# Patient Record
Sex: MALE | Race: WHITE | Employment: FULL TIME | URBAN - METROPOLITAN AREA
[De-identification: names, ages, dates, MRNs, and addresses within clinical notes are randomized per-mention and may not be internally consistent; named-entity substitution may affect disease eponyms.]

---

## 2018-02-13 ENCOUNTER — THERAPY VISIT (OUTPATIENT)
Dept: PHYSICAL THERAPY | Facility: CLINIC | Age: 43
End: 2018-02-13
Payer: COMMERCIAL

## 2018-02-13 DIAGNOSIS — R20.2 NUMBNESS AND TINGLING IN LEFT UPPER EXTREMITY: Primary | ICD-10-CM

## 2018-02-13 DIAGNOSIS — R20.0 NUMBNESS AND TINGLING IN LEFT UPPER EXTREMITY: Primary | ICD-10-CM

## 2018-02-13 PROBLEM — M54.2 CERVICALGIA: Status: ACTIVE | Noted: 2018-02-13

## 2018-02-13 PROCEDURE — 97110 THERAPEUTIC EXERCISES: CPT | Mod: GP | Performed by: PHYSICAL THERAPIST

## 2018-02-13 PROCEDURE — 97161 PT EVAL LOW COMPLEX 20 MIN: CPT | Mod: GP | Performed by: PHYSICAL THERAPIST

## 2018-02-13 NOTE — PROGRESS NOTES
[place] [facility] Initial Evaluation  Subjective:  HPI                    Objective:  System    Physical Exam    [facility] for [facility] Initial Evaluation -- Cervical    Evaluation Date: February 13, 2018  Jose Manuel Xie is a 43 year old male with a L arm condition.   Referral: neurology  Work mechanical stresses:  [employer]  Employment status: FT  Leisure mechanical stresses:   Functional disability score (NDI):  See chart  VAS score (0-10): 0  Patient goals/expectations:  Prevent neck pain correct posture strengthen hand    HISTORY:    Present symptoms: neck stiffness. L  medial forearm N/T to  hand- index and thumb primarily.weakness hand.    He is R handed. Chin, bottom lip numbness.  Pain quality (sharp/shooting/stabbing/aching/burning/cramping):  No pain  Present since (onset date): 12.15.2017.     Symptoms (improving/unchanging/worsening):  improving    Symptoms commenced as a result of: sleeping on L side with arm flexed underneath him.  Condition occurred in the following environment:  In laws home    Symptoms at onset (neck/arm/forearm/headache): arm and hand  Constant symptoms (neck/arm/forearm/headache):  Yes for all  Intermittent symptoms (neck/arm/forearm/headache):     Symptoms are made worse with the following: time of day - No effect movement, postures- no effect.  Symptoms are made better with the following: nothing     Disturbed sleep (yes/no): no  Number of pillows:   Sleeping postures (prone/sup/side R/L): R SL now    Previous episodes (0/1-5/6-10/11+): 0 Year of first episode:     Previous history:   Previous treatments:     Specific Questions: (as reported by the patient)  Dizziness/Tinnitus/Nausea/Swallowing (pos/neg): dizziness, food getting caught   Gait/Upper Limbs (normal/abnormal): loss of motor control hand  Medications (nil/NSAIDS/anlag/steroids/anticoag/other):  None  Medical allergies:  none  General health  (excellent/good/fair/poor):  Good CS disc replacement C6/7 2015 R side  Pertinent medical history:  Migraines/Headaches  Imaging (None/Xray/MRI/Other):  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  Recent or major surgery (yes/no): no  Night pain (yes/no): no  Accidents (yes/no): no  Unexplained weight loss (yes/no): na  Barriers at home: none  Other red flags: none    EXAMINATION    Posture:   Sitting (good/fair/poor): poor  Standing (good/fair/poor): fair     Protruded head (yes/no): yes    Wry Neck (right/left/nil):  no  Relevant (yes/no):  no     Correction of posture(better/worse/no effect): no effect  Other observations:      Neurological:    Motor Deficit:  Wrist ext dec  63/100# thumb finger ext weak painless   Reflexes:  na  Sensory Deficit:  Dorsal 1-4 digits lower lip chin   Dural signs:      Movement Loss:   Mohinder Mod Min Nil Pain   Protrusion    +    Flexion    + Warmness center CS dizziness    Retraction   +  N/T L forearm   Extension   + ant neck tight     Lateral flexion R        Lateral flexion L  +   N/T chin   Rotation R   +     Rotation L    +      Test Movements:   During: produces, abolishes, increases, decreases, no effect, centralizing, peripheralizing  After: better, worse, no better, no worse, no effect, centralized, peripheralized    Pretest symptoms sitting: L arm N/T to hand   Symptoms During Symptoms After ROM increased ROM decreased No Effect   PRO          Rep PRO          RET No Effect    No Effect         Rep RET No Effect    No Effect    +     RET EXT No Effect    No Effect         Rep RET EXT Produces  Inc chin numbness  Worse   chin lip numbness  +- self DC    Pretest symptoms lying:     Symptoms During Symptoms After ROM increased ROM decreased No Effect   RET          Rep RET          RET EXT          Rep RET EXT          If required, pretest symptoms sitting:      Symptoms During Symptoms After ROM increased ROM decreased No Effect    LF-R          Rep LF-R          LF-L          Rep LF-L          ROT-R          Rep ROT-R          ROT-L          Rep ROT-L          FLEX          Rep FLEX              Static Tests:   Protrusion:   NE Flexion:  NE  Retraction:    Extension (sitting/prone/supine):      Other Tests:     Provisional Classification:  Inconclusive/Other - suspect radial n pathology    Principle of Management:  Education:  Radial n vs CS radiculopathy   Equipment provided:  Towel roll  Mechanical therapy (Y/N):  Y   Extension principle:     Lateral principle:    Flexion principle:     Other:  REISit 10 x 2 x day for postural correction strengthening hand    ASSESSMENT/PLAN:    Patient is a 43 year old male with cervical and left side elbow complaints.    Patient has the following significant findings with corresponding treatment plan.                Diagnosis 1:  Radial n pathology neck stiffness  Decreased ROM/flexibility - therapeutic exercise, therapeutic activity and home program  Decreased strength - therapeutic exercise, therapeutic activities and home program  Decreased function - therapeutic activities and home program  Impaired posture - neuro re-education, therapeutic activities and home program    Therapy Evaluation Codes:   1) History comprised of:   Personal factors that impact the plan of care:      None.    Comorbidity factors that impact the plan of care are:      Migraines/headaches.     Medications impacting care: None.  2) Examination of Body Systems comprised of:   Body structures and functions that impact the plan of care:      Cervical spine and Elbow.   Activity limitations that impact the plan of care are:      Grasping, Working and Sleeping.  3) Clinical presentation characteristics are:   Stable/Uncomplicated.  4) Decision-Making    Low complexity using standardized patient assessment instrument and/or measureable assessment of functional outcome.  Cumulative Therapy Evaluation is: Low complexity.    Previous  and current functional limitations:  (See Goal Flow Sheet for this information)    Short term and Long term goals: (See Goal Flow Sheet for this information)     Communication ability:  Patient appears to be able to clearly communicate and understand verbal and written communication and follow directions correctly.  Treatment Explanation - The following has been discussed with the patient:   RX ordered/plan of care  Anticipated outcomes  Possible risks and side effects  This patient would benefit from PT intervention to resume normal activities.   Rehab potential is good.    Frequency:  2 X a month, once daily  Duration:  for 2 months  Discharge Plan:  Achieve all LTG.  Independent in home treatment program.  Reach maximal therapeutic benefit.    Please refer to the daily flowsheet for treatment today, total treatment time and time spent performing 1:1 timed codes.

## 2018-02-13 NOTE — LETTER
[facility] PHYSICAL THERAPY  [address]   [place] [address]  [phone]    2018    Re: Jose Manuel Xie   :   1975  MRN:  [id number]   REFERRING PHYSICIAN:   Yung Broussard    [facility] PHYSICAL THERAPY    Date of Initial Evaluation:  2018  Visits:    1  Reason for Referral:  Numbness and tingling in left upper extremity  [facility] Initial Evaluation -- Cervical  Evaluation Date: 2018  Jose Manuel Xie is a 43 year old male with a L arm condition.   Referral: neurology  Work mechanical stresses:  [employer]  Employment status: FT  Leisure mechanical stresses:   Functional disability score (NDI):  See chart  VAS score (0-10): 0  Patient goals/expectations:  Prevent neck pain correct posture strengthen hand    HISTORY:  Present symptoms: neck stiffness. L  medial forearm N/T to  hand- index and thumb primarily.weakness hand.    He is R handed. Chin, bottom lip numbness.  Pain quality (sharp/shooting/stabbing/aching/burning/cramping):  No pain  Present since (onset date): 12.15.2017.     Symptoms (improving/unchanging/worsening):  improving  Symptoms commenced as a result of: sleeping on L side with arm flexed underneath him.  Condition occurred in the following environment:  In laws home  Symptoms at onset (neck/arm/forearm/headache): arm and hand  Constant symptoms (neck/arm/forearm/headache):  Yes for all  Intermittent symptoms (neck/arm/forearm/headache):   Symptoms are made worse with the following: time of day - No effect movement, postures- no effect.  Symptoms are made better with the following: nothing   Disturbed sleep (yes/no): no    Number of pillows:   Sleeping postures (prone/sup/side R/L): R SL now  Previous episodes (0/1-5/6-10/11+): 0   Year of first episode:   Previous history:   Previous treatments:         Re: Jose Manuel Xie   :    1975    Specific Questions: (as reported by the patient)  Dizziness/Tinnitus/Nausea/Swallowing (pos/neg): dizziness, food getting caught   Gait/Upper Limbs (normal/abnormal): loss of motor control hand  Medications (nil/NSAIDS/anlag/steroids/anticoag/other):  None  Medical allergies:  none  General health (excellent/good/fair/poor):  Good CS disc replacement C6/7 2015 R side  Pertinent medical history:  Migraines/Headaches  Imaging (None/Xray/MRI/Other):  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  Recent or major surgery (yes/no): no  Night pain (yes/no): no  Accidents (yes/no): no  Unexplained weight loss (yes/no): na  Barriers at home: none  Other red flags: none    EXAMINATION    Posture:   Sitting (good/fair/poor): poor    Standing (good/fair/poor): fair   Protruded head (yes/no): yes    Wry Neck (right/left/nil):  no    Relevant (yes/no):  no   Correction of posture(better/worse/no effect): no effect  Other observations:      Neurological:  Motor Deficit:  Wrist ext dec  63/100# thumb finger ext weak painless   Reflexes:  na  Sensory Deficit:  Dorsal 1-4 digits lower lip chin     Dural signs:      Movement Loss:   Mohinder Mod Min Nil Pain   Protrusion    +    Flexion    + Warmness center CS dizziness    Retraction   +  N/T L forearm   Extension   + ant neck tight     Lateral flexion R        Lateral flexion L  +   N/T chin   Rotation R   +     Rotation L    +      Test Movements:   During: produces, abolishes, increases, decreases, no effect, centralizing, peripheralizing  After: better, worse, no better, no worse, no effect, centralized, peripheralized  Re: Jose Manuel Xie   :   1975    Pretest symptoms sitting: L arm N/T to hand   Symptoms During Symptoms After ROM increased ROM decreased No Effect   PRO          Rep PRO          RET No Effect    No Effect         Rep RET No Effect    No Effect    +     RET EXT No Effect    No Effect         Rep RET EXT  Produces  Inc chin numbness  Worse   chin lip numbness  +- self DC    Pretest symptoms lying:     Symptoms During Symptoms After ROM increased ROM decreased No Effect   RET          Rep RET          RET EXT          Rep RET EXT          If required, pretest symptoms sitting:      Symptoms During Symptoms After ROM increased ROM decreased No Effect   LF-R          Rep LF-R          LF-L          Rep LF-L          ROT-R          Rep ROT-R          ROT-L          Rep ROT-L          FLEX          Rep FLEX          Static Tests:   Protrusion:   NE Flexion:  NE  Retraction:    Extension (sitting/prone/supine):    Other Tests:   Provisional Classification:  Inconclusive/Other - suspect radial n pathology  Principle of Management:  Education:  Radial n vs CS radiculopathy     Equipment provided:  Towel roll  Mechanical therapy (Y/N):  Y   Extension principle:       Lateral principle:    Flexion principle:       Other:  REISit 10 x 2 x day for postural correction strengthening hand        Re: Jose Manuel Xie   :   1975    ASSESSMENT/PLAN:  Patient is a 43 year old male with cervical and left side elbow complaints.    Patient has the following significant findings with corresponding treatment plan.                Diagnosis 1:  Radial n pathology neck stiffness  Decreased ROM/flexibility - therapeutic exercise, therapeutic activity and home program  Decreased strength - therapeutic exercise, therapeutic activities and home program  Decreased function - therapeutic activities and home program  Impaired posture - neuro re-education, therapeutic activities and home program    Therapy Evaluation Codes:   1) History comprised of:   Personal factors that impact the plan of care:      None.    Comorbidity factors that impact the plan of care are:      Migraines/headaches.     Medications impacting care: None.  2) Examination of Body Systems comprised of:   Body structures and functions that impact the plan of care:       Cervical spine and Elbow.   Activity limitations that impact the plan of care are:      Grasping, Working and Sleeping.  3) Clinical presentation characteristics are:   Stable/Uncomplicated.  4) Decision-Making    Low complexity using standardized patient assessment instrument and/or measureable assessment of functional outcome.  Cumulative Therapy Evaluation is: Low complexity.    Previous and current functional limitations:  (See Goal Flow Sheet for this information)    Short term and Long term goals: (See Goal Flow Sheet for this information)   Communication ability:  Patient appears to be able to clearly communicate and understand verbal and written communication and follow directions correctly.  Treatment Explanation - The following has been discussed with the patient:   RX ordered/plan of care, Anticipated outcomes, Possible risks and side effects    This patient would benefit from PT intervention to resume normal activities.   Rehab potential is good.  Frequency:  2 X a month, once daily  Duration:  for 2 months  Discharge Plan:  Achieve all LTG.  Independent in home treatment program.  Reach maximal therapeutic benefit.    Thank you for your referral.    INQUIRIES  Therapist:  Mendy Wilkinson, PT, cert MDT  [facility] PHYSICAL THERAPY  [address] [place] [address] [address]  [facility] [address]  Phone: [phone] Fax: [phone]

## 2018-02-15 PROBLEM — R20.2 NUMBNESS AND TINGLING IN LEFT UPPER EXTREMITY: Status: ACTIVE | Noted: 2018-02-15

## 2018-02-15 PROBLEM — R20.0 NUMBNESS AND TINGLING IN LEFT UPPER EXTREMITY: Status: ACTIVE | Noted: 2018-02-15

## 2021-06-30 ENCOUNTER — THERAPY VISIT (OUTPATIENT)
Dept: PHYSICAL THERAPY | Facility: CLINIC | Age: 46
End: 2021-06-30
Payer: COMMERCIAL

## 2021-06-30 DIAGNOSIS — M25.511 RIGHT SHOULDER PAIN: ICD-10-CM

## 2021-06-30 PROCEDURE — 97110 THERAPEUTIC EXERCISES: CPT | Mod: GP | Performed by: PHYSICAL THERAPIST

## 2021-06-30 PROCEDURE — 97161 PT EVAL LOW COMPLEX 20 MIN: CPT | Mod: GP | Performed by: PHYSICAL THERAPIST

## 2021-06-30 NOTE — PROGRESS NOTES
Physical Therapy Initial Evaluation  Subjective:    Patient Health History  Jose Manuel Xie being seen for R shoulder pain.     Problem began: 9/12/2020.   Problem occurred: gradual onset   Pain is reported as 8/10 on pain scale.  General health as reported by patient is good.  Pertinent medical history includes: depression and osteoarthritis.   Red flags:  None as reported by patient.  Medical allergies: none.   Surgeries include:  Orthopedic surgery. Other surgery history details: cervical disc replacement.        Current occupation is teacher  for [employer].   Primary job tasks include:  Repetitive tasks.                                Pt is R hand dominant and he started having R shoulder pain after returning to school last fall when he had a prolonged break from conducting and when he began conducting frequently again his shoulder became painful. Currently it is limiting his lifting and carrying and he would benefit from PT to address ROM and strength for full return of R shoulder function.    Objective:        SHOULDER:    Cervical Screen: normal and painfree, history of cervical surgery    Shoulder:   PROM L PROM R AROM L AROM R MMT L MMT R   Flex   175 164 5/5 /5   Abd   175 163 5/5 5/5   Full Can     5/5 5/5   Empty Can     5/5 5/5   IR   T6 T8 5/5 5/5+   ER   85 90 5/5 5/5   Ext/IR           Scapulothoraic Rhythm: increased upward rotation and elevation on R vs L    Palpation: no TTP    Special tests:   L R   Impingement     Neer's neg nt   Hawkin's-Maado neg nt   Coracoid Impingement     Internal impingement     Labral     Anterior Slide     Wayland's     Crank     Instability     Apprehension (anterior)     Relocation (anterior)     Anterior Load & Shift     Posterior Load & Shift     Posterior instability (with 90 degrees flex)     Multi-Directional Instability      Sulcus     Biceps      Speed's neg neg   Rotator Cuff Tear     Drop Arm neg neg   Belly Press neg +   Lift off   neg +     GH Mobility  L  R   Posterior glide limited limited   Inferior glide wnl wnl   Anterior glide wnl wnl               System    Physical Exam    General     ROS    Assessment/Plan:    Patient is a 46 year old male with right side shoulder complaints.    Patient has the following significant findings with corresponding treatment plan.                Diagnosis 1:  R shoulder pain  Pain -  hot/cold therapy, US, electric stimulation, manual therapy, education and home program  Decreased ROM/flexibility - manual therapy, therapeutic exercise, therapeutic activity and home program  Decreased strength - therapeutic exercise, therapeutic activities and home program    Cumulative Therapy Evaluation is: Low complexity.    Previous and current functional limitations:  (See Goal Flow Sheet for this information)    Short term and Long term goals: (See Goal Flow Sheet for this information)     Communication ability:  Patient appears to be able to clearly communicate and understand verbal and written communication and follow directions correctly.  Treatment Explanation - The following has been discussed with the patient:   RX ordered/plan of care  Anticipated outcomes  Possible risks and side effects  This patient would benefit from PT intervention to resume normal activities.   Rehab potential is good.    Frequency:  1 X week, once daily  Duration:  for 10 weeks  Discharge Plan:  Achieve all LTG.  Independent in home treatment program.  Reach maximal therapeutic benefit.    Please refer to the daily flowsheet for treatment today, total treatment time and time spent performing 1:1 timed codes.

## 2021-06-30 NOTE — LETTER
[facility]  [address]  [place] [address]  [phone]    2021    Re: Jose Manuel Xie   :   1975  MRN:  [id number]   REFERRING PHYSICIAN:   Milvia ARCE [facility]    Date of Initial Evaluation:  21  Visits:  Rxs Used: 1  Reason for Referral:  Right shoulder pain    Physical Therapy Initial Evaluation  Subjective:    Patient Health History  Jose Manuel Xie being seen for R shoulder pain.   Problem began: 2020.   Problem occurred: gradual onset   Pain is reported as 8/10 on pain scale.  General health as reported by patient is good.  Pertinent medical history includes: depression and osteoarthritis.   Red flags:  None as reported by patient.  Medical allergies: none.   Surgeries include:  Orthopedic surgery. Other surgery history details: cervical disc replacement.    Current occupation is teacher  for [employer].   Primary job tasks include:  Repetitive tasks.   Pt is R hand dominant and he started having R shoulder pain after returning to school last fall when he had a prolonged break from conducting and when he began conducting frequently again his shoulder became painful. Currently it is limiting his lifting and carrying and he would benefit from PT to address ROM and strength for full return of R shoulder function.    Objective:    SHOULDER:    Cervical Screen: normal and painfree, history of cervical surgery    Shoulder:   PROM L PROM R AROM L AROM R MMT L MMT R   Flex   175 164 5/5 /5   Abd   175 163 5/5 5/5   Full Can     5/5 5/5   Empty Can     5/5 5/5   IR   T6 T8 5/5 5/5+   ER   85 90 5/5 5/5   Ext/IR           Scapulothoraic Rhythm: increased upward rotation and elevation on R vs L    Palpation: no TTP    Special tests:   L R   Impingement     Neer's neg nt   Hawkin's-Amado neg nt   Coracoid Impingement     Internal impingement     Labral     Anterior  Slide     Moore's     Crank     Instability     Apprehension (anterior)     Relocation (anterior)     Anterior Load & Shift     Posterior Load & Shift     Posterior instability (with 90 degrees flex)     Multi-Directional Instability      Sulcus     Biceps      Speed's neg neg   Rotator Cuff Tear     Drop Arm neg neg   Belly Press neg +   Lift off  neg +     GH Mobility  L  R   Posterior glide limited limited   Inferior glide wnl wnl   Anterior glide wnl wnl     Assessment/Plan:    Patient is a 46 year old male with right side shoulder complaints.    Patient has the following significant findings with corresponding treatment plan.                Diagnosis 1:  R shoulder pain  Pain -  hot/cold therapy, US, electric stimulation, manual therapy, education and home program  Decreased ROM/flexibility - manual therapy, therapeutic exercise, therapeutic activity and home program  Decreased strength - therapeutic exercise, therapeutic activities and home program    Cumulative Therapy Evaluation is: Low complexity.  Previous and current functional limitations:  (See Goal Flow Sheet for this information)    Short term and Long term goals: (See Goal Flow Sheet for this information)   Communication ability:  Patient appears to be able to clearly communicate and understand verbal and written communication and follow directions correctly.  Treatment Explanation - The following has been discussed with the patient:   RX ordered/plan of care  Anticipated outcomes  Possible risks and side effects  This patient would benefit from PT intervention to resume normal activities.   Rehab potential is good.    Frequency:  1 X week, once daily  Duration:  for 10 weeks  Discharge Plan:  Achieve all LTG.  Independent in home treatment program.  Reach maximal therapeutic benefit.    Thank you for your referral.    INQUIRIES  Therapist: Kumar Ng DPT  [facility] REHABILITATION SERVICES [place] [facility]  [address]  [place]  [address]  Phone: [phone]  Fax: [phone]

## 2021-07-26 ENCOUNTER — THERAPY VISIT (OUTPATIENT)
Dept: PHYSICAL THERAPY | Facility: CLINIC | Age: 46
End: 2021-07-26
Payer: COMMERCIAL

## 2021-07-26 DIAGNOSIS — M25.511 RIGHT SHOULDER PAIN: ICD-10-CM

## 2021-07-26 PROCEDURE — 97110 THERAPEUTIC EXERCISES: CPT | Mod: GP | Performed by: PHYSICAL THERAPIST

## 2021-07-26 PROCEDURE — 97140 MANUAL THERAPY 1/> REGIONS: CPT | Mod: GP | Performed by: PHYSICAL THERAPIST

## 2022-04-13 ENCOUNTER — TRANSCRIBE ORDERS (OUTPATIENT)
Dept: OTHER | Age: 47
End: 2022-04-13
Payer: COMMERCIAL

## 2022-04-13 DIAGNOSIS — M77.12 LEFT LATERAL EPICONDYLITIS: Primary | ICD-10-CM
